# Patient Record
Sex: MALE | Race: WHITE | NOT HISPANIC OR LATINO | Employment: OTHER | URBAN - METROPOLITAN AREA
[De-identification: names, ages, dates, MRNs, and addresses within clinical notes are randomized per-mention and may not be internally consistent; named-entity substitution may affect disease eponyms.]

---

## 2017-06-18 ENCOUNTER — HOSPITAL ENCOUNTER (EMERGENCY)
Facility: MEDICAL CENTER | Age: 82
End: 2017-06-18
Attending: EMERGENCY MEDICINE
Payer: MEDICARE

## 2017-06-18 VITALS
RESPIRATION RATE: 16 BRPM | DIASTOLIC BLOOD PRESSURE: 57 MMHG | TEMPERATURE: 98.1 F | HEART RATE: 85 BPM | OXYGEN SATURATION: 97 % | BODY MASS INDEX: 24.91 KG/M2 | HEIGHT: 67 IN | WEIGHT: 158.73 LBS | SYSTOLIC BLOOD PRESSURE: 104 MMHG

## 2017-06-18 DIAGNOSIS — N30.00 ACUTE CYSTITIS WITHOUT HEMATURIA: ICD-10-CM

## 2017-06-18 LAB
ANION GAP SERPL CALC-SCNC: 8 MMOL/L (ref 0–11.9)
APPEARANCE UR: CLEAR
BACTERIA #/AREA URNS HPF: ABNORMAL /HPF
BASOPHILS # BLD AUTO: 0.3 % (ref 0–1.8)
BASOPHILS # BLD: 0.03 K/UL (ref 0–0.12)
BILIRUB UR QL STRIP.AUTO: NEGATIVE
BUN SERPL-MCNC: 24 MG/DL (ref 8–22)
CALCIUM SERPL-MCNC: 8.8 MG/DL (ref 8.5–10.5)
CHLORIDE SERPL-SCNC: 105 MMOL/L (ref 96–112)
CO2 SERPL-SCNC: 21 MMOL/L (ref 20–33)
COLOR UR: YELLOW
CREAT SERPL-MCNC: 1 MG/DL (ref 0.5–1.4)
CULTURE IF INDICATED INDCX: YES UA CULTURE
EOSINOPHIL # BLD AUTO: 0.12 K/UL (ref 0–0.51)
EOSINOPHIL NFR BLD: 1.4 % (ref 0–6.9)
EPI CELLS #/AREA URNS HPF: ABNORMAL /HPF
ERYTHROCYTE [DISTWIDTH] IN BLOOD BY AUTOMATED COUNT: 51.2 FL (ref 35.9–50)
GFR SERPL CREATININE-BSD FRML MDRD: >60 ML/MIN/1.73 M 2
GLUCOSE SERPL-MCNC: 102 MG/DL (ref 65–99)
GLUCOSE UR STRIP.AUTO-MCNC: NEGATIVE MG/DL
HCT VFR BLD AUTO: 36.8 % (ref 42–52)
HGB BLD-MCNC: 12.8 G/DL (ref 14–18)
IMM GRANULOCYTES # BLD AUTO: 0.05 K/UL (ref 0–0.11)
IMM GRANULOCYTES NFR BLD AUTO: 0.6 % (ref 0–0.9)
KETONES UR STRIP.AUTO-MCNC: NEGATIVE MG/DL
LEUKOCYTE ESTERASE UR QL STRIP.AUTO: ABNORMAL
LYMPHOCYTES # BLD AUTO: 1.39 K/UL (ref 1–4.8)
LYMPHOCYTES NFR BLD: 15.7 % (ref 22–41)
MCH RBC QN AUTO: 35.9 PG (ref 27–33)
MCHC RBC AUTO-ENTMCNC: 34.8 G/DL (ref 33.7–35.3)
MCV RBC AUTO: 103.1 FL (ref 81.4–97.8)
MICRO URNS: ABNORMAL
MONOCYTES # BLD AUTO: 1.03 K/UL (ref 0–0.85)
MONOCYTES NFR BLD AUTO: 11.6 % (ref 0–13.4)
NEUTROPHILS # BLD AUTO: 6.26 K/UL (ref 1.82–7.42)
NEUTROPHILS NFR BLD: 70.4 % (ref 44–72)
NITRITE UR QL STRIP.AUTO: NEGATIVE
NRBC # BLD AUTO: 0 K/UL
NRBC BLD AUTO-RTO: 0 /100 WBC
PH UR STRIP.AUTO: 5 [PH]
PLATELET # BLD AUTO: 143 K/UL (ref 164–446)
PMV BLD AUTO: 11.4 FL (ref 9–12.9)
POTASSIUM SERPL-SCNC: 3.6 MMOL/L (ref 3.6–5.5)
PROT UR QL STRIP: NEGATIVE MG/DL
RBC # BLD AUTO: 3.57 M/UL (ref 4.7–6.1)
RBC # URNS HPF: ABNORMAL /HPF
RBC UR QL AUTO: NEGATIVE
SODIUM SERPL-SCNC: 134 MMOL/L (ref 135–145)
SP GR UR STRIP.AUTO: 1.01
WBC # BLD AUTO: 8.9 K/UL (ref 4.8–10.8)
WBC #/AREA URNS HPF: ABNORMAL /HPF

## 2017-06-18 PROCEDURE — 51798 US URINE CAPACITY MEASURE: CPT

## 2017-06-18 PROCEDURE — 85025 COMPLETE CBC W/AUTO DIFF WBC: CPT

## 2017-06-18 PROCEDURE — A9270 NON-COVERED ITEM OR SERVICE: HCPCS | Performed by: EMERGENCY MEDICINE

## 2017-06-18 PROCEDURE — 80048 BASIC METABOLIC PNL TOTAL CA: CPT

## 2017-06-18 PROCEDURE — 87086 URINE CULTURE/COLONY COUNT: CPT

## 2017-06-18 PROCEDURE — 36415 COLL VENOUS BLD VENIPUNCTURE: CPT

## 2017-06-18 PROCEDURE — 87077 CULTURE AEROBIC IDENTIFY: CPT

## 2017-06-18 PROCEDURE — 700102 HCHG RX REV CODE 250 W/ 637 OVERRIDE(OP): Performed by: EMERGENCY MEDICINE

## 2017-06-18 PROCEDURE — 81001 URINALYSIS AUTO W/SCOPE: CPT

## 2017-06-18 PROCEDURE — 99284 EMERGENCY DEPT VISIT MOD MDM: CPT

## 2017-06-18 PROCEDURE — 87186 SC STD MICRODIL/AGAR DIL: CPT

## 2017-06-18 RX ORDER — VALACYCLOVIR HYDROCHLORIDE 500 MG/1
500 TABLET, FILM COATED ORAL 2 TIMES DAILY
COMMUNITY

## 2017-06-18 RX ORDER — TAMSULOSIN HYDROCHLORIDE 0.4 MG/1
0.4 CAPSULE ORAL
COMMUNITY

## 2017-06-18 RX ORDER — SULFAMETHOXAZOLE AND TRIMETHOPRIM 800; 160 MG/1; MG/1
1 TABLET ORAL ONCE
Status: COMPLETED | OUTPATIENT
Start: 2017-06-18 | End: 2017-06-18

## 2017-06-18 RX ORDER — SULFAMETHOXAZOLE AND TRIMETHOPRIM 800; 160 MG/1; MG/1
1 TABLET ORAL 2 TIMES DAILY
Qty: 14 TAB | Refills: 0 | Status: SHIPPED | OUTPATIENT
Start: 2017-06-18

## 2017-06-18 RX ORDER — MYCOPHENOLATE MOFETIL 500 MG/1
1000 TABLET ORAL 2 TIMES DAILY
COMMUNITY

## 2017-06-18 RX ORDER — PREDNISONE 10 MG/1
10 TABLET ORAL DAILY
COMMUNITY

## 2017-06-18 RX ORDER — ALBUTEROL SULFATE 90 UG/1
2 AEROSOL, METERED RESPIRATORY (INHALATION) EVERY 6 HOURS PRN
COMMUNITY

## 2017-06-18 RX ORDER — DIAPER,BRIEF,ADULT, DISPOSABLE
1 EACH MISCELLANEOUS DAILY
COMMUNITY

## 2017-06-18 RX ORDER — MONTELUKAST SODIUM 10 MG/1
10 TABLET ORAL DAILY
COMMUNITY

## 2017-06-18 RX ORDER — LEVOTHYROXINE SODIUM 0.05 MG/1
50 TABLET ORAL
COMMUNITY

## 2017-06-18 RX ORDER — ESOMEPRAZOLE MAGNESIUM 40 MG/1
40 CAPSULE, DELAYED RELEASE ORAL DAILY
COMMUNITY

## 2017-06-18 RX ORDER — METRONIDAZOLE 10 MG/G
1 GEL TOPICAL 2 TIMES DAILY
COMMUNITY

## 2017-06-18 RX ADMIN — SULFAMETHOXAZOLE AND TRIMETHOPRIM 1 TABLET: 800; 160 TABLET ORAL at 17:11

## 2017-06-18 ASSESSMENT — PAIN SCALES - GENERAL: PAINLEVEL_OUTOF10: 1

## 2017-06-18 ASSESSMENT — LIFESTYLE VARIABLES: DO YOU DRINK ALCOHOL: NO

## 2017-06-18 NOTE — ED NOTES
"Pt reports urinary retention, reports decrease \"flow\". Pt denies urinary pain or blood in urine. Pt reports urinary hx of incontinence.   "

## 2017-06-18 NOTE — ED AVS SNAPSHOT
BenchPrep Access Code: [email or web address]  Expires: 7/18/2017  5:04 PM    Your email address is not on file at [facility].  Email Addresses are required for you to sign up for BenchPrep, please contact [phone] to verify your personal information and to provide your email address prior to attempting to register for Bench[place] [address]    [employer]  A secure, online tool to manage your health information     [facility]’s BenchPrep® is a secure, online tool that connects you to your personalized health information from the privacy of your home -- day or night - making it very easy for you to manage your healthcare. Once the activation process is completed, you can even access your medical information using the BenchPrep merritt, which is available for free in the Apple Merritt store or Google Play store.     To learn more about BenchPrep, visit [email or web address]    There are two levels of access available (as shown below):   My Chart Features  [facility] Primary Care Doctor [facility]  Specialists [facility]  Urgent  Care Non-[facility] Primary Care Doctor   Email your healthcare team securely and privately 24/7 X X X    Manage appointments: schedule your next appointment; view details of past/upcoming appointments X      Request prescription refills. X      View recent personal medical records, including lab and immunizations X X X X   View health record, including health history, allergies, medications X X X X   Read reports about your outpatient visits, procedures, consult and ER notes X X X X   See your discharge summary, which is a recap of your hospital and/or ER visit that includes your diagnosis, lab results, and care plan X X  X     How to register for [employer]:  Once your e-mail address has been verified, follow the following steps to sign up for BenchPrep.     1. Go to  [email or web address]  2. Click on the Sign Up Now box, which takes you to the New Member Sign Up page. You  will need to provide the following information:  a. Enter your [employer] Access Code exactly as it appears at the top of this page. (You will not need to use this code after you’ve completed the sign-up process. If you do not sign up before the expiration date, you must request a new code.)   b. Enter your date of birth.   c. Enter your home email address.   d. Click Submit, and follow the next screen’s instructions.  3. Create a [email or web address] ID. This will be your [employer] login ID and cannot be changed, so think of one that is secure and easy to remember.  4. Create a [employer] password. You can change your password at any time.  5. Enter your Password Reset Question and Answer. This can be used at a later time if you forget your password.   6. Enter your e-mail address. This allows you to receive e-mail notifications when new information is available in [employer].  7. Click Sign Up. You can now view your health information.    For assistance activating your [employer] account, call [phone]

## 2017-06-18 NOTE — ED NOTES
Med Rec completed per patient  Allergies reviewed  No ORAL antibiotics in last 30 days    Patient takes prednisone for Myasthenia Gravis

## 2017-06-18 NOTE — ED NOTES
Blood and urine sample sent to lab.  Called [employer] tech for PVR bladder scan  Called [employer] for assistance pt's medication list

## 2017-06-18 NOTE — ED NOTES
".  Chief Complaint   Patient presents with   • Urinary Pain   Pt reports 1/10 abdominal pain, states \" very very slight discomfort.\"  Increased frequency when urinating. + minor dysuria. Onset last evening. No flank pain. No fever/chills.     "

## 2017-06-18 NOTE — ED AVS SNAPSHOT
Home Care Instructions                                                                                                                Mike Lynn   MRN: [id number]    Department:  [facility], Emergency Dept   Date of Visit:  6/18/2017            [facility], Emergency Dept    [address]    [place] [address]    Phone:  [phone]      You were seen by     Rommel Marshall D.O.      Your Diagnosis Was     Acute cystitis without hematuria     N30.00       Follow-up Information     1. Please follow up.    Why:  Follow up with your physician      Medication Information     Review all of your home medications and newly ordered medications with your primary doctor and/or pharmacist as soon as possible. Follow medication instructions as directed by your doctor and/or pharmacist.     Please keep your complete medication list with you and share with your physician. Update the information when medications are discontinued, doses are changed, or new medications (including over-the-counter products) are added; and carry medication information at all times in the event of emergency situations.               Medication List      START taking these medications        Instructions    Morning Afternoon Evening Bedtime    * sulfamethoxazole-trimethoprim 800-160 MG tablet   Commonly known as:  BACTRIM DS        Take 1 Tab by mouth 2 times a day.   Dose:  1 Tab                        * sulfamethoxazole-trimethoprim 800-160 MG tablet   Commonly known as:  BACTRIM DS        Take 1 Tab by mouth 2 times a day.   Dose:  1 Tab                        * Notice:  This list has 2 medication(s) that are the same as other medications prescribed for you. Read the directions carefully, and ask your doctor or other care provider to review them with you.      ASK your doctor about these medications        Instructions    Morning Afternoon Evening Bedtime    albuterol 108 (90 BASE) MCG/ACT Aers  inhalation aerosol        Inhale 2 Puffs by mouth every 6 hours as needed for Shortness of Breath.   Dose:  2 Puff                        ASTRAGALUS PO        Take 2,000 mg by mouth every day.   Dose:  2000 mg                        CELLCEPT 500 MG tablet   Generic drug:  mycophenolate        Take 1,000 mg by mouth 2 times a day.   Dose:  1000 mg                        CITRACAL MAXIMUM 315-250 MG-UNIT Tabs   Generic drug:  Calcium Citrate-Vitamin D        Take 1 Tab by mouth every day.   Dose:  1 Tab                        ELIQUIS 5mg Tabs   Generic drug:  apixaban        Take 5 mg by mouth 2 Times a Day.   Dose:  5 mg                        MARGARITO C PO        Take 1,000 mg by mouth every day.   Dose:  1000 mg                        Lecithin 1200 MG Caps        Take 1 Cap by mouth every day.   Dose:  1 Cap                        levothyroxine 50 MCG Tabs   Commonly known as:  SYNTHROID        Take 50 mcg by mouth Every morning on an empty stomach.   Dose:  50 mcg                        LIMBREL 500 MG Caps   Generic drug:  Flavocoxid        Take 1 Cap by mouth every day.   Dose:  1 Cap                        METROGEL 1 % gel   Generic drug:  metronidazole        Apply 1 Application to affected area(s) 2 times a day. CHEST & SHOULDERS   Dose:  1 Application                        montelukast 10 MG Tabs   Commonly known as:  SINGULAIR        Take 10 mg by mouth every day.   Dose:  10 mg                        multivitamin Tabs        Take 1 Tab by mouth every day.   Dose:  1 Tab                        mupirocin 2 % Oint   Commonly known as:  BACTROBAN        Apply 1 Application to affected area(s) 2 times a day. BUTTOCKS   Dose:  1 Application                        NEXIUM 40 MG delayed-release capsule   Generic drug:  esomeprazole        Take 40 mg by mouth every day.   Dose:  40 mg                        predniSONE 10 MG Tabs   Commonly known as:  DELTASONE        Take 10 mg by mouth every day.   Dose:  10 mg                         PROBIOTIC DAILY PO        Take 2 Tabs by mouth every day.   Dose:  2 Tab                        tamsulosin 0.4 MG capsule   Commonly known as:  FLOMAX        Take 0.4 mg by mouth ONE-HALF HOUR AFTER BREAKFAST.   Dose:  0.4 mg                        valacyclovir 500 MG Tabs   Commonly known as:  VALTREX        Take 500 mg by mouth 2 times a day. TYPE 1 HERPES   Dose:  500 mg                        VERAMYST 27.5 MCG/SPRAY nasal spray   Generic drug:  fluticasone        Spray 2 Sprays in nose 3 times a day as needed.   Dose:  2 Spray                        vitamin D 1000 UNIT Tabs   Commonly known as:  cholecalciferol        Take 1,000 Units by mouth every day.   Dose:  1000 Units                             Where to Get Your Medications      You can get these medications from any pharmacy     Bring a paper prescription for each of these medications    - sulfamethoxazole-trimethoprim 800-160 MG tablet  - sulfamethoxazole-trimethoprim 800-160 MG tablet            Procedures and tests performed during your visit     BASIC METABOLIC PANEL    CBC WITH DIFFERENTIAL    ESTIMATED GFR    ORTHOPEDIC TECHNICIAN ASSISTANCE    URINALYSIS,CULTURE IF INDICATED    URINE CULTURE(NEW)    URINE MICROSCOPIC (W/UA)        Discharge Instructions       Urinary Tract Infection  A urinary tract infection (UTI) can occur any place along the urinary tract. The tract includes the kidneys, ureters, bladder, and urethra. A type of germ called bacteria often causes a UTI. UTIs are often helped with antibiotic medicine.   HOME CARE   · If given, take antibiotics as told by your doctor. Finish them even if you start to feel better.  · Drink enough fluids to keep your pee (urine) clear or pale yellow.  · Avoid tea, drinks with caffeine, and bubbly (carbonated) drinks.  · Pee often. Avoid holding your pee in for a long time.  · Pee before and after having sex (intercourse).  · Wipe from front to back after you poop (bowel movement) if you  are a woman. Use each tissue only once.  GET HELP RIGHT AWAY IF:   · You have back pain.  · You have lower belly (abdominal) pain.  · You have chills.  · You feel sick to your stomach (nauseous).  · You throw up (vomit).  · Your burning or discomfort with peeing does not go away.  · You have a fever.  · Your symptoms are not better in 3 days.  MAKE SURE YOU:   · Understand these instructions.  · Will watch your condition.  · Will get help right away if you are not doing well or get worse.     This information is not intended to replace advice given to you by your health care provider. Make sure you discuss any questions you have with your health care provider.     Document Released: 06/05/2009 Document Revised: 01/08/2016 Document Reviewed: 07/18/2013  [employer] Interactive Patient Education ©2016 [employer]            Patient Information     Patient Information    Following emergency treatment: all patient requiring follow-up care must return either to a private physician or a clinic if your condition worsens before you are able to obtain further medical attention, please return to the emergency room.     Billing Information    At [facility], we work to make the billing process streamlined for our patients.  Our Representatives are here to answer any questions you may have regarding your hospital bill.  If you have insurance coverage and have supplied your insurance information to us, we will submit a claim to your insurer on your behalf.  Should you have any questions regarding your bill, we can be reached online or by phone as follows:  Online: You are able pay your bills online or live chat with our representatives about any billing questions you may have. We are here to help Monday - Friday from 8:00am to 7:30pm and 9:00am - 12:00pm on Saturdays.  Please visit [email or web address]  for more information.   Phone:  [phone] or [phone]    Please note that your  emergency physician, surgeon, pathologist, radiologist, anesthesiologist, and other specialists are not employed by [facility] and will therefore bill separately for their services.  Please contact them directly for any questions concerning their bills at the numbers below:     Emergency Physician Services:  [phone]  [facility]:  [phone]  [facility]:  [phone]  [facility]:  [phone]    1. Your final bill may vary from the amount quoted upon discharge if all procedures are not complete at that time, or if your doctor has additional procedures of which we are not aware. You will receive an additional bill if you return to the Emergency Department at [facility] for suture removal regardless of the facility of which the sutures were placed.     2. Please arrange for settlement of this account at the emergency registration.    3. All self-pay accounts are due in full at the time of treatment.  If you are unable to meet this obligation then payment is expected within 4-5 days.     4. If you have had radiology studies (CT, X-ray, Ultrasound, MRI), you have received a preliminary result during your emergency department visit. Please contact the radiology department [phone] to receive a copy of your final result. Please discuss the Final result with your primary physician or with the follow up physician provided.     Crisis Hotline:  [place] Crisis Hotline:  9-108-OREXRXP or 1-155.853.4630  [place] Crisis Hotline:    1-815.606.1126 or 616-154-1202         ED Discharge Follow Up Questions    1. In order to provide you with very good care, we would like to follow up with a phone call in the next few days.  May we have your permission to contact you?     YES /  NO    2. What is the best phone number to call you? (       )_____-__________    3. What is the best time to call you?      Morning  /  Afternoon  /  Evening                   Patient Signature:   ____________________________________________________________    Date:  ____________________________________________________________

## 2017-06-18 NOTE — DISCHARGE INSTRUCTIONS
Urinary Tract Infection  A urinary tract infection (UTI) can occur any place along the urinary tract. The tract includes the kidneys, ureters, bladder, and urethra. A type of germ called bacteria often causes a UTI. UTIs are often helped with antibiotic medicine.   HOME CARE   · If given, take antibiotics as told by your doctor. Finish them even if you start to feel better.  · Drink enough fluids to keep your pee (urine) clear or pale yellow.  · Avoid tea, drinks with caffeine, and bubbly (carbonated) drinks.  · Pee often. Avoid holding your pee in for a long time.  · Pee before and after having sex (intercourse).  · Wipe from front to back after you poop (bowel movement) if you are a woman. Use each tissue only once.  GET HELP RIGHT AWAY IF:   · You have back pain.  · You have lower belly (abdominal) pain.  · You have chills.  · You feel sick to your stomach (nauseous).  · You throw up (vomit).  · Your burning or discomfort with peeing does not go away.  · You have a fever.  · Your symptoms are not better in 3 days.  MAKE SURE YOU:   · Understand these instructions.  · Will watch your condition.  · Will get help right away if you are not doing well or get worse.     This information is not intended to replace advice given to you by your health care provider. Make sure you discuss any questions you have with your health care provider.     Document Released: 06/05/2009 Document Revised: 01/08/2016 Document Reviewed: 07/18/2013  [employer] Interactive Patient Education ©2016 [employer]

## 2017-06-19 NOTE — ED NOTES
Discharge instructions reviewed, questions answered.  Pt to lobby with family, escorted by RN. Pt provided with prescription X 1.  Pt states all belongings in possession.

## 2017-06-20 ENCOUNTER — HOSPITAL ENCOUNTER (EMERGENCY)
Facility: MEDICAL CENTER | Age: 82
End: 2017-06-20
Attending: EMERGENCY MEDICINE
Payer: MEDICARE

## 2017-06-20 VITALS
RESPIRATION RATE: 15 BRPM | SYSTOLIC BLOOD PRESSURE: 101 MMHG | OXYGEN SATURATION: 97 % | WEIGHT: 156.75 LBS | BODY MASS INDEX: 23.76 KG/M2 | HEIGHT: 68 IN | HEART RATE: 75 BPM | DIASTOLIC BLOOD PRESSURE: 61 MMHG | TEMPERATURE: 99.3 F

## 2017-06-20 DIAGNOSIS — N39.0 ACUTE UTI: ICD-10-CM

## 2017-06-20 LAB
APPEARANCE UR: ABNORMAL
BACTERIA #/AREA URNS HPF: ABNORMAL /HPF
BACTERIA UR CULT: ABNORMAL
BACTERIA UR CULT: ABNORMAL
BILIRUB UR QL STRIP.AUTO: NEGATIVE
COLOR UR: YELLOW
CULTURE IF INDICATED INDCX: YES UA CULTURE
EPI CELLS #/AREA URNS HPF: ABNORMAL /HPF
GLUCOSE UR STRIP.AUTO-MCNC: NEGATIVE MG/DL
HYALINE CASTS #/AREA URNS LPF: ABNORMAL /LPF
KETONES UR STRIP.AUTO-MCNC: NEGATIVE MG/DL
LEUKOCYTE ESTERASE UR QL STRIP.AUTO: ABNORMAL
MICRO URNS: ABNORMAL
NITRITE UR QL STRIP.AUTO: NEGATIVE
PH UR STRIP.AUTO: 5.5 [PH]
PROT UR QL STRIP: NEGATIVE MG/DL
RBC # URNS HPF: ABNORMAL /HPF
RBC UR QL AUTO: NEGATIVE
SIGNIFICANT IND 70042: ABNORMAL
SITE SITE: ABNORMAL
SOURCE SOURCE: ABNORMAL
SP GR UR STRIP.AUTO: 1.01
WBC #/AREA URNS HPF: ABNORMAL /HPF

## 2017-06-20 PROCEDURE — 87086 URINE CULTURE/COLONY COUNT: CPT

## 2017-06-20 PROCEDURE — 700102 HCHG RX REV CODE 250 W/ 637 OVERRIDE(OP): Performed by: EMERGENCY MEDICINE

## 2017-06-20 PROCEDURE — 87186 SC STD MICRODIL/AGAR DIL: CPT

## 2017-06-20 PROCEDURE — 87077 CULTURE AEROBIC IDENTIFY: CPT

## 2017-06-20 PROCEDURE — A9270 NON-COVERED ITEM OR SERVICE: HCPCS | Performed by: EMERGENCY MEDICINE

## 2017-06-20 PROCEDURE — 99284 EMERGENCY DEPT VISIT MOD MDM: CPT

## 2017-06-20 PROCEDURE — 81001 URINALYSIS AUTO W/SCOPE: CPT

## 2017-06-20 RX ORDER — NITROFURANTOIN 25; 75 MG/1; MG/1
100 CAPSULE ORAL ONCE
Status: COMPLETED | OUTPATIENT
Start: 2017-06-20 | End: 2017-06-20

## 2017-06-20 RX ORDER — NITROFURANTOIN 25; 75 MG/1; MG/1
100 CAPSULE ORAL 2 TIMES DAILY
Qty: 20 CAP | Refills: 0 | Status: SHIPPED | OUTPATIENT
Start: 2017-06-20

## 2017-06-20 RX ADMIN — NITROFURANTOIN (MONOHYDRATE/MACROCRYSTALS) 100 MG: 75; 25 CAPSULE ORAL at 14:38

## 2017-06-20 ASSESSMENT — LIFESTYLE VARIABLES: DO YOU DRINK ALCOHOL: NO

## 2017-06-20 ASSESSMENT — PAIN SCALES - GENERAL: PAINLEVEL_OUTOF10: 2

## 2017-06-20 NOTE — ED AVS SNAPSHOT
6/20/2017    Mike Lynn  [address] [place] [address]    Dear Mike:    [facility] wants to ensure your discharge home is safe and you or your loved ones have had all of your questions answered regarding your care after you leave the hospital.    Below is a list of resources and contact information should you have any questions regarding your hospital stay, follow-up instructions, or active medical symptoms.    Questions or Concerns Regarding… Contact   Medical Questions Related to Your Discharge  (7 days a week, 8am-5pm) Contact a Nurse Care Coordinator   [phone]   Medical Questions Not Related to Your Discharge  (24 hours a day / 7 days a week)  Contact the Nurse Health Line   [phone]    Medications or Discharge Instructions Refer to your discharge packet   or contact your [facility] Primary Care Provider   [phone]   Follow-up Appointment(s) Schedule your appointment via [employer]   or contact Scheduling [phone]   Billing Review your statement via InVision  or contact Billing [phone]   Medical Records Review your records via [employer]   or contact Medical Records [phone]     You may receive a telephone call within two days of discharge. This call is to make certain you understand your discharge instructions and have the opportunity to have any questions answered. You can also easily access your medical information, test results and upcoming appointments via the In[employer]ision free online health management tool. You can learn more and sign up at [employer]Supply/InVision. For assistance setting up your InVision account, please call [phone].    Once again, we want to ensure your discharge home is safe and that you have a clear understanding of any next steps in your care. If you have any questions or concerns, please do not hesitate to contact us, we are here for you. Thank you for choosing [facility] for your healthcare needs.    Sincerely,    Your [facility] Healthcare Team

## 2017-06-20 NOTE — ED NOTES
All lines and monitors d/c'd. Discharge paperwork and medications reviewed with pt and his wife. Pt states he understands and has no questions. Pt encouraged to follow-up with neurology and come back to ER with worsening symptoms. Instructed not to drive after taking pain medication. Pt verbalizes understanding. Pt ambulated out of ED of with steady gait.

## 2017-06-20 NOTE — ED AVS SNAPSHOT
Home Care Instructions                                                                                                                Mike Lynn   MRN: [id number]    Department:  [facility], Emergency Dept   Date of Visit:  6/20/2017            [facility], Emergency Dept    [address]    [place] [address]    Phone:  [phone]      You were seen by     Smith Bueno D.O.      Your Diagnosis Was     Acute UTI     N39.0       Follow-up Information     1. Follow up with Wenceslao Dugan M.D..    Specialty:  Urology    Contact information    [address]  [place] [address] [phone]        Medication Information     Review all of your home medications and newly ordered medications with your primary doctor and/or pharmacist as soon as possible. Follow medication instructions as directed by your doctor and/or pharmacist.     Please keep your complete medication list with you and share with your physician. Update the information when medications are discontinued, doses are changed, or new medications (including over-the-counter products) are added; and carry medication information at all times in the event of emergency situations.               Medication List      START taking these medications        Instructions    Morning Afternoon Evening Bedtime    nitrofurantoin monohydr macro 100 MG Caps   Commonly known as:  MACROBID        Take 1 Cap by mouth 2 times a day.   Dose:  100 mg                          ASK your doctor about these medications        Instructions    Morning Afternoon Evening Bedtime    albuterol 108 (90 BASE) MCG/ACT Aers inhalation aerosol        Inhale 2 Puffs by mouth every 6 hours as needed for Shortness of Breath.   Dose:  2 Puff                        ASTRAGALUS PO        Take 2,000 mg by mouth every day.   Dose:  2000 mg                        CELLCEPT 500 MG tablet   Generic drug:  mycophenolate        Take 1,000 mg by mouth  2 times a day.   Dose:  1000 mg                        CITRACAL MAXIMUM 315-250 MG-UNIT Tabs   Generic drug:  Calcium Citrate-Vitamin D        Take 1 Tab by mouth every day.   Dose:  1 Tab                        ELIQUIS 5mg Tabs   Generic drug:  apixaban        Take 5 mg by mouth 2 Times a Day.   Dose:  5 mg                        MARGARITO C PO        Take 1,000 mg by mouth every day.   Dose:  1000 mg                        Lecithin 1200 MG Caps        Take 1 Cap by mouth every day.   Dose:  1 Cap                        levothyroxine 50 MCG Tabs   Commonly known as:  SYNTHROID        Take 50 mcg by mouth Every morning on an empty stomach.   Dose:  50 mcg                        LIMBREL 500 MG Caps   Generic drug:  Flavocoxid        Take 1 Cap by mouth every day.   Dose:  1 Cap                        METROGEL 1 % gel   Generic drug:  metronidazole        Apply 1 Application to affected area(s) 2 times a day. CHEST & SHOULDERS   Dose:  1 Application                        montelukast 10 MG Tabs   Commonly known as:  SINGULAIR        Take 10 mg by mouth every day.   Dose:  10 mg                        multivitamin Tabs        Take 1 Tab by mouth every day.   Dose:  1 Tab                        mupirocin 2 % Oint   Commonly known as:  BACTROBAN        Apply 1 Application to affected area(s) 2 times a day. BUTTOCKS   Dose:  1 Application                        NEXIUM 40 MG delayed-release capsule   Generic drug:  esomeprazole        Take 40 mg by mouth every day.   Dose:  40 mg                        predniSONE 10 MG Tabs   Commonly known as:  DELTASONE        Take 10 mg by mouth every day.   Dose:  10 mg                        PROBIOTIC DAILY PO        Take 2 Tabs by mouth every day.   Dose:  2 Tab                        * sulfamethoxazole-trimethoprim 800-160 MG tablet   Commonly known as:  BACTRIM DS        Take 1 Tab by mouth 2 times a day.   Dose:  1 Tab                        * sulfamethoxazole-trimethoprim 800-160 MG  tablet   Commonly known as:  BACTRIM DS        Take 1 Tab by mouth 2 times a day.   Dose:  1 Tab                        tamsulosin 0.4 MG capsule   Commonly known as:  FLOMAX        Take 0.4 mg by mouth ONE-HALF HOUR AFTER BREAKFAST.   Dose:  0.4 mg                        valacyclovir 500 MG Tabs   Commonly known as:  VALTREX        Take 500 mg by mouth 2 times a day. TYPE 1 HERPES   Dose:  500 mg                        VERAMYST 27.5 MCG/SPRAY nasal spray   Generic drug:  fluticasone        Spray 2 Sprays in nose 3 times a day as needed.   Dose:  2 Spray                        vitamin D 1000 UNIT Tabs   Commonly known as:  cholecalciferol        Take 1,000 Units by mouth every day.   Dose:  1000 Units                        * Notice:  This list has 2 medication(s) that are the same as other medications prescribed for you. Read the directions carefully, and ask your doctor or other care provider to review them with you.         Where to Get Your Medications      You can get these medications from any pharmacy     Bring a paper prescription for each of these medications    - nitrofurantoin monohydr macro 100 MG Caps            Procedures and tests performed during your visit     URINALYSIS,CULTURE IF INDICATED    URINE MICROSCOPIC (W/UA)        Discharge Instructions       Urinary Tract Infection  A urinary tract infection (UTI) can occur any place along the urinary tract. The tract includes the kidneys, ureters, bladder, and urethra. A type of germ called bacteria often causes a UTI. UTIs are often helped with antibiotic medicine.   HOME CARE   · If given, take antibiotics as told by your doctor. Finish them even if you start to feel better.  · Drink enough fluids to keep your pee (urine) clear or pale yellow.  · Avoid tea, drinks with caffeine, and bubbly (carbonated) drinks.  · Pee often. Avoid holding your pee in for a long time.  · Pee before and after having sex (intercourse).  · Wipe from front to back after  you poop (bowel movement) if you are a woman. Use each tissue only once.  GET HELP RIGHT AWAY IF:   · You have back pain.  · You have lower belly (abdominal) pain.  · You have chills.  · You feel sick to your stomach (nauseous).  · You throw up (vomit).  · Your burning or discomfort with peeing does not go away.  · You have a fever.  · Your symptoms are not better in 3 days.  MAKE SURE YOU:   · Understand these instructions.  · Will watch your condition.  · Will get help right away if you are not doing well or get worse.     This information is not intended to replace advice given to you by your health care provider. Make sure you discuss any questions you have with your health care provider.     Document Released: 06/05/2009 Document Revised: 01/08/2016 Document Reviewed: 07/18/2013  [employer] Interactive Patient Education ©2016 [employer]            Patient Information     Patient Information    Following emergency treatment: all patient requiring follow-up care must return either to a private physician or a clinic if your condition worsens before you are able to obtain further medical attention, please return to the emergency room.     Billing Information    At [facility], we work to make the billing process streamlined for our patients.  Our Representatives are here to answer any questions you may have regarding your hospital bill.  If you have insurance coverage and have supplied your insurance information to us, we will submit a claim to your insurer on your behalf.  Should you have any questions regarding your bill, we can be reached online or by phone as follows:  Online: You are able pay your bills online or live chat with our representatives about any billing questions you may have. We are here to help Monday - Friday from 8:00am to 7:30pm and 9:00am - 12:00pm on Saturdays.  Please visit [email or web address]  for more information.   Phone:  [phone] or  [phone]    Please note that your emergency physician, surgeon, pathologist, radiologist, anesthesiologist, and other specialists are not employed by [facility] and will therefore bill separately for their services.  Please contact them directly for any questions concerning their bills at the numbers below:     Emergency Physician Services:  [phone]  [facility]:  [phone]  [facility]:  [phone]  [facility]:  [phone]    1. Your final bill may vary from the amount quoted upon discharge if all procedures are not complete at that time, or if your doctor has additional procedures of which we are not aware. You will receive an additional bill if you return to the Emergency Department at [facility] for suture removal regardless of the facility of which the sutures were placed.     2. Please arrange for settlement of this account at the emergency registration.    3. All self-pay accounts are due in full at the time of treatment.  If you are unable to meet this obligation then payment is expected within 4-5 days.     4. If you have had radiology studies (CT, X-ray, Ultrasound, MRI), you have received a preliminary result during your emergency department visit. Please contact the radiology department [phone] to receive a copy of your final result. Please discuss the Final result with your primary physician or with the follow up physician provided.     Crisis Hotline:  [place] Crisis Hotline:  9-113-GXDTYMH or 1-662.240.7487  [place] Crisis Hotline:    1-158.846.2339 or 356-672-3699         ED Discharge Follow Up Questions    1. In order to provide you with very good care, we would like to follow up with a phone call in the next few days.  May we have your permission to contact you?     YES /  NO    2. What is the best phone number to call you? (       )_____-__________    3. What is the best time to call you?      Morning  /  Afternoon  /   Evening                   Patient Signature:  ____________________________________________________________    Date:  ____________________________________________________________

## 2017-06-20 NOTE — DISCHARGE INSTRUCTIONS
Urinary Tract Infection  A urinary tract infection (UTI) can occur any place along the urinary tract. The tract includes the kidneys, ureters, bladder, and urethra. A type of germ called bacteria often causes a UTI. UTIs are often helped with antibiotic medicine.   HOME CARE   · If given, take antibiotics as told by your doctor. Finish them even if you start to feel better.  · Drink enough fluids to keep your pee (urine) clear or pale yellow.  · Avoid tea, drinks with caffeine, and bubbly (carbonated) drinks.  · Pee often. Avoid holding your pee in for a long time.  · Pee before and after having sex (intercourse).  · Wipe from front to back after you poop (bowel movement) if you are a woman. Use each tissue only once.  GET HELP RIGHT AWAY IF:   · You have back pain.  · You have lower belly (abdominal) pain.  · You have chills.  · You feel sick to your stomach (nauseous).  · You throw up (vomit).  · Your burning or discomfort with peeing does not go away.  · You have a fever.  · Your symptoms are not better in 3 days.  MAKE SURE YOU:   · Understand these instructions.  · Will watch your condition.  · Will get help right away if you are not doing well or get worse.     This information is not intended to replace advice given to you by your health care provider. Make sure you discuss any questions you have with your health care provider.     Document Released: 06/05/2009 Document Revised: 01/08/2016 Document Reviewed: 07/18/2013  [employer] Interactive Patient Education ©2016 [employer]

## 2017-06-20 NOTE — ED PROVIDER NOTES
ED Provider Note    CHIEF COMPLAINT  Chief Complaint   Patient presents with   • UTI     seen here for same       HPI  Mike Lynn is a 85 y.o. male here for evaluation of dysuria. Patient states that he was seen and evaluated couple of days ago, was placed on Bactrim. At this time, he states he is still having symptoms of burning and urgency/frequency. He states he is taking antibiotics as prescribed. He has no fever, no vomiting, no abdominal pain, and no back pain. He denies any headache or chest pain, and has no shortness of breath. He states that he has to be careful about his antibiotics secondary to his history of myasthenia gravis.    PAST MEDICAL HISTORY   has a past medical history of Myasthenia gravis (CMS-[facility]).    SOCIAL HISTORY  Social History     Social History Main Topics   • Smoking status: Never Smoker    • Smokeless tobacco: Not on file   • Alcohol Use: No   • Drug Use: No   • Sexual Activity: Not on file       SURGICAL HISTORY  patient denies any surgical history    CURRENT MEDICATIONS  Home Medications     Reviewed by Lori Majano R.N. (Registered Nurse) on 06/20/17 at 1225  Med List Status: Not Addressed    Medication Last Dose Status    albuterol 108 (90 BASE) MCG/ACT Aero Soln inhalation aerosol 1 WEEK Active    apixaban (ELIQUIS) 5mg Tab 6/18/2017 Active    ASTRAGALUS PO 6/17/2017 Active    Bioflavonoid Products (MARGARITO C PO) 6/17/2017 Active    Calcium Citrate-Vitamin D (CITRACAL MAXIMUM) 315-250 MG-UNIT Tab 6/17/2017 Active    esomeprazole (NEXIUM) 40 MG delayed-release capsule 6/17/2017 Active    Flavocoxid (LIMBREL) 500 MG Cap 6/17/2017 Active    fluticasone (VERAMYST) 27.5 MCG/SPRAY nasal spray 1 WEEK Active    Lecithin 1200 MG Cap 6/17/2017 Active    levothyroxine (SYNTHROID) 50 MCG Tab 6/18/2017 Active    metronidazole (METROGEL) 1 % gel 6/18/2017 Active    montelukast (SINGULAIR) 10 MG Tab 6/17/2017 Active    multivitamin (THERAGRAN) Tab 6/17/2017 Active    mupirocin  "(BACTROBAN) 2 % Ointment 6/18/2017 Active    mycophenolate (CELLCEPT) 500 MG tablet 6/18/2017 Active    predniSONE (DELTASONE) 10 MG Tab 6/17/2017 Active    Probiotic Product (PROBIOTIC DAILY PO) 6/18/2017 Active    sulfamethoxazole-trimethoprim (BACTRIM DS) 800-160 MG tablet  Active    sulfamethoxazole-trimethoprim (BACTRIM DS) 800-160 MG tablet  Active    tamsulosin (FLOMAX) 0.4 MG capsule 6/18/2017 Active    valacyclovir (VALTREX) 500 MG Tab 6/18/2017 Active    vitamin D (CHOLECALCIFEROL) 1000 UNIT Tab 6/17/2017 Active                ALLERGIES  Allergies   Allergen Reactions   • Ceftin [Cefuroxime Axetil]    • Ceftriaxone    • Pcn [Penicillins]        REVIEW OF SYSTEMS  See HPI for further details. Review of systems as above, otherwise all other systems are negative.     PHYSICAL EXAM  VITAL SIGNS: /72 mmHg  Pulse 80  Temp(Src) 37.4 °C (99.3 °F)  Resp 17  Ht 1.715 m (5' 7.5\")  Wt 71.1 kg (156 lb 12 oz)  BMI 24.17 kg/m2  SpO2 96%    Constitutional: No distress. Well nourished.  HENT: Head is atraumatic. Oropharynx is moist.   Eyes: Conjunctivae are normal. EOMI.   Respiratory: No respiratory distress. Equal chest expansion.   Abd:  Soft, non tender.  No guard.  Back;  No cvat.  Non tender midline.   Neurological: Alert. No focal deficits noted.    Skin: No rash. No Pallor.   Psych: Appropriate for clinical situation. Normal affect.      PROCEDURES     MEDICAL RECORD  I have reviewed patient's medical record and pertinent results are listed above.    COURSE & MEDICAL DECISION MAKING  I have reviewed any medical record information, laboratory studies and radiographic results as noted above.    Differential diagnoses include but not limited to: uti, pyelo     1:36 PM  Pharmacy is going to look into appropriate antibiotics for the patient.    1:58 PM  Patient is nontoxic-appearing, and afebrile. At this time, we are currently working on finding a another antibiotic to treat his UTI. He was seen and " evaluated 2 days ago, and has been taking the Bactrim, that appears to be resistant on culture. Because he appears well clinically, has no back pain and no fever, I feel is appropriate at this time to switch his antibiotic and have him get a repeat visit in 1-2 days.      2:01 PM  Katalina from pharmacy states to use bactrim for the uti.  He will stop the bactrim.     2:18 PM  Pt comfortable, afebrile, all questions answered.  He will be given his abx here, and they have a script to go.  I verified the [facility] pharmacy for them, and they are open.    This patient presents with a uti .  At this time, I have counseled the patient/family regarding their medications, pain control, and follow up.  They will continue their medications, if any, as prescribed.  They will return immediately for any worsening symptoms and/or any other medical concerns.  They will see their doctor, or contact the doctor provided, in 1-2 days for follow up.       FINAL IMPRESSION  UTI      Electronically signed by: Smith Bueno, 6/20/2017 12:41 PM

## 2017-06-20 NOTE — ED PROVIDER NOTES
"ED Provider Note    CHIEF COMPLAINT  Chief Complaint   Patient presents with   • Urinary Pain       HPI  Mike Lynn is a 85 y.o. male who presents to the emergency today with complaint of increased frequency of urination/burning with urination. Patient states that this started last evening. He's had a difficult time initiating urine stream is \"decreased\". He brought in by his wife is at bedside and feels he may have a bladder infection. He has some pressures type feeling over his lower abdomen which she describes his pain slight discomfort. No nausea no vomiting no chest pain shows breath fever chills. Has a history of myasthenia gravis is currently on medications for this. Symptoms occurred in the context of his normal activities.    REVIEW OF SYSTEMS  See HPI for further details. All other systems are negative.     PAST MEDICAL HISTORY  Past Medical History   Diagnosis Date   • Myasthenia gravis (CMS-[facility])        FAMILY HISTORY  [unfilled]    SOCIAL HISTORY  Social History     Social History   • Marital Status:      Spouse Name: N/A   • Number of Children: N/A   • Years of Education: N/A     Social History Main Topics   • Smoking status: Never Smoker    • Smokeless tobacco: None   • Alcohol Use: No   • Drug Use: No   • Sexual Activity: Not Asked     Other Topics Concern   • None     Social History Narrative       SURGICAL HISTORY  History reviewed. No pertinent past surgical history.    CURRENT MEDICATIONS  Home Medications     Reviewed by Diana Bhakta (Pharmacy Tech) on 06/18/17 at 1614  Med List Status: Complete    Medication Last Dose Status    albuterol 108 (90 BASE) MCG/ACT Aero Soln inhalation aerosol 1 WEEK Active    apixaban (ELIQUIS) 5mg Tab 6/18/2017 Active    ASTRAGALUS PO 6/17/2017 Active    Bioflavonoid Products (MARGARITO C PO) 6/17/2017 Active    Calcium Citrate-Vitamin D (CITRACAL MAXIMUM) 315-250 MG-UNIT Tab 6/17/2017 Active    esomeprazole (NEXIUM) 40 MG delayed-release " "capsule 6/17/2017 Active    Flavocoxid (LIMBREL) 500 MG Cap 6/17/2017 Active    fluticasone (VERAMYST) 27.5 MCG/SPRAY nasal spray 1 WEEK Active    Lecithin 1200 MG Cap 6/17/2017 Active    levothyroxine (SYNTHROID) 50 MCG Tab 6/18/2017 Active    metronidazole (METROGEL) 1 % gel 6/18/2017 Active    montelukast (SINGULAIR) 10 MG Tab 6/17/2017 Active    multivitamin (THERAGRAN) Tab 6/17/2017 Active    mupirocin (BACTROBAN) 2 % Ointment 6/18/2017 Active    mycophenolate (CELLCEPT) 500 MG tablet 6/18/2017 Active    predniSONE (DELTASONE) 10 MG Tab 6/17/2017 Active    Probiotic Product (PROBIOTIC DAILY PO) 6/18/2017 Active    tamsulosin (FLOMAX) 0.4 MG capsule 6/18/2017 Active    valacyclovir (VALTREX) 500 MG Tab 6/18/2017 Active    vitamin D (CHOLECALCIFEROL) 1000 UNIT Tab 6/17/2017 Active                ALLERGIES  Allergies   Allergen Reactions   • Ceftin [Cefuroxime Axetil]    • Ceftriaxone    • Pcn [Penicillins]        PHYSICAL EXAM  VITAL SIGNS: /57 mmHg  Pulse 85  Temp(Src) 36.7 °C (98.1 °F) (Temporal)  Resp 16  Ht 1.702 m (5' 7\")  Wt 72 kg (158 lb 11.7 oz)  BMI 24.85 kg/m2  SpO2 97% Room air O2: 95    Constitutional: Well developed, Well nourished, No acute distress, Non-toxic appearance.   HENT: Normocephalic, Atraumatic, Bilateral external ears normal, Oropharynx moist, No oral exudates, Nose normal.   Eyes: PERRLA, EOMI, Conjunctiva normal, No discharge.   Neck: Normal range of motion, No tenderness, Supple, No stridor.   Lymphatic: No lymphadenopathy noted.   Cardiovascular: Normal heart rate, Normal rhythm, No murmurs, No rubs, No gallops.   Thorax & Lungs: Normal breath sounds, No respiratory distress, No wheezing, No chest tenderness.   Abdomen: Bowel sounds normal, Soft, No tenderness, No masses, No pulsatile masses.   Skin: Warm, Dry, No erythema, No rash.   Back: No tenderness, No CVA tenderness.    Extremities: Intact distal pulses, No edema, No tenderness, No cyanosis, No clubbing. "   Musculoskeletal: Good range of motion in all major joints. No tenderness to palpation or major deformities noted.   Neurologic: Alert & oriented x 3, Normal motor function, Normal sensory function, No focal deficits noted.   Psychiatric: Affect normal, Judgment normal, Mood normal.         RADIOLOGY/PROCEDURES  Bladder scan shows residual 60 mL    COURSE & MEDICAL DECISION MAKING  Pertinent Labs & Imaging studies reviewed. (See chart for details)  Urine was positive for white blood cells/leukocytes with 20 white blood cells per high-power field. This is consistent with his history of frequency and dysuria. He has multiple allergies to medications. And also some contraindications because his myasthenia gravis was placed on Bactrim which he apparently has taken before without difficulty. Given his 1st dose here in the emergency room. He does not have urinary retention bladder scan shows proximal mid 60 mL after voiding. His white blood cell count is normal and he is afebrile and does not appear septic and is in no distress. Discussed with patient and his wife need for follow-up, he was placed on Bactrim for home. If he develops at any time fever below 100°, increased pain, vomiting, worsening symptoms or next 1-4 hours to return to emergency room. Otherwise follow-up with his primary care physician. He verbalizes understanding instructions as well as his wife at bedside discharge as above.    FINAL IMPRESSION  1. Acute cystitis/UTI  2. History of myasthenia gravis  3.         Electronically signed by: Rommel Marshall, 6/20/2017 12:47 PM

## 2017-06-20 NOTE — ED AVS SNAPSHOT
Bgifty Access Code: [email or web address]  Expires: 7/18/2017  5:04 PM    Your email address is not on file at [facility].  Email Addresses are required for you to sign up for Bgifty, please contact [phone] to verify your personal information and to provide your email address prior to attempting to register for Bgifty.    [place] [address]    [employer]  A secure, online tool to manage your health information     [facility]’s Bgifty® is a secure, online tool that connects you to your personalized health information from the privacy of your home -- day or night - making it very easy for you to manage your healthcare. Once the activation process is completed, you can even access your medical information using the Bgifty merritt, which is available for free in the Apple Merritt store or Google Play store.     To learn more about Bgifty, visit [email or web address] ([email or web address]    There are two levels of access available (as shown below):   My Chart Features  [facility] Primary Care Doctor [facility]  Specialists [facility]  Urgent  Care Non-[facility] Primary Care Doctor   Email your healthcare team securely and privately 24/7 X X X    Manage appointments: schedule your next appointment; view details of past/upcoming appointments X      Request prescription refills. X      View recent personal medical records, including lab and immunizations X X X X   View health record, including health history, allergies, medications X X X X   Read reports about your outpatient visits, procedures, consult and ER notes X X X X   See your discharge summary, which is a recap of your hospital and/or ER visit that includes your diagnosis, lab results, and care plan X X  X     How to register for [employer]:  Once your e-mail address has been verified, follow the following steps to sign up for Bgifty.     1. Go to  [email or web address]  2. Click on the Sign Up Now box, which takes you to the New Member Sign Up page. You  will need to provide the following information:  a. Enter your KeyLemon Access Code exactly as it appears at the top of this page. (You will not need to use this code after you’ve completed the sign-up process. If you do not sign up before the expiration date, you must request a new code.)   b. Enter your date of birth.   c. Enter your home email address.   d. Click Submit, and follow the next screen’s instructions.  3. Create a [email or web address] ID. This will be your KeyLemon login ID and cannot be changed, so think of one that is secure and easy to remember.  4. Create a KeyLemon password. You can change your password at any time.  5. Enter your Password Reset Question and Answer. This can be used at a later time if you forget your password.   6. Enter your e-mail address. This allows you to receive e-mail notifications when new information is available in KeyLemon.  7. Click Sign Up. You can now view your health information.    For assistance activating your KeyLemon account, call [phone]

## 2017-06-22 LAB
BACTERIA UR CULT: ABNORMAL
BACTERIA UR CULT: ABNORMAL
SIGNIFICANT IND 70042: ABNORMAL
SITE SITE: ABNORMAL
SOURCE SOURCE: ABNORMAL

## 2017-06-22 NOTE — ED NOTES
ED Positive Culture Follow-up/Notification Note:    Date: 6/22/17     Patient seen in the ED on 6/20/2017 for dysuria despite being treated with Bactrim.   1. Acute UTI       Discharge Medication List as of 6/20/2017  2:22 PM      START taking these medications    Details   nitrofurantoin monohydr macro (MACROBID) 100 MG Cap Take 1 Cap by mouth 2 times a day., Disp-20 Cap, R-0, Print Rx Paper             Allergies: Ceftin; Ceftriaxone; and Pcn     Final cultures:   Results     Procedure Component Value Units Date/Time    URINE CULTURE(NEW) [[id number]]  (Abnormal)  (Susceptibility) Collected:  06/20/17 1230    Order Status:  Completed Specimen Information:  Urine Updated:  06/22/17 1123     Significant Indicator POS (POS)      Source UR      Site --      Urine Culture -- (A)      Urine Culture -- (A)      Result:        Klebsiella oxytoca  >100,000 cfu/mL      Culture & Susceptibility     KLEBSIELLA OXYTOCA     Antibiotic Sensitivity Microscan Unit Status    Ampicillin Resistant >16 mcg/mL Final    Cefepime Sensitive <=8 mcg/mL Final    Cefotaxime Sensitive <=2 mcg/mL Final    Cefotetan Sensitive <=16 mcg/mL Final    Ceftazidime Sensitive <=1 mcg/mL Final    Ceftriaxone Sensitive <=8 mcg/mL Final    Cefuroxime Sensitive <=4 mcg/mL Final    Cephalothin Sensitive <=8 mcg/mL Final    Ciprofloxacin Intermediate 2 mcg/mL Final    Gentamicin Resistant >8 mcg/mL Final    Levofloxacin Sensitive <=2 mcg/mL Final    Nitrofurantoin Sensitive <=32 mcg/mL Final    Pip/Tazobactam Sensitive <=16 mcg/mL Final    Piperacillin Resistant >64 mcg/mL Final    Tigecycline Sensitive <=2 mcg/mL Final    Tobramycin Sensitive <=4 mcg/mL Final    Trimeth/Sulfa Resistant >2/38 mcg/mL Final                       URINALYSIS,CULTURE IF INDICATED [[id number]]  (Abnormal) Collected:  06/20/17 1230    Order Status:  Completed Specimen Information:  Urine Updated:  06/20/17 1332     Micro Urine Req Microscopic      Color Yellow      Character Sl Cloudy  (A)      Specific Gravity 1.015      Ph 5.5      Glucose Negative mg/dL      Ketones Negative mg/dL      Protein Negative mg/dL      Bilirubin Negative      Nitrite Negative      Leukocyte Esterase Large (A)      Occult Blood Negative      Culture Indicated Yes UA Culture           Plan:   Patient with myasthenia gravis and refuses alternative treatments as they may worsen his symptoms.   Patient without any flank pain or systemic symptoms.   Nitrofurantoin will provide adequate coverage against his UTI. No change required.     Katalina Ricardo

## 2017-10-04 NOTE — ED AVS SNAPSHOT
6/18/2017    Mike Lynn  [address] [place] [address]    Dear Mike:    [facility] wants to ensure your discharge home is safe and you or your loved ones have had all of your questions answered regarding your care after you leave the hospital.    Below is a list of resources and contact information should you have any questions regarding your hospital stay, follow-up instructions, or active medical symptoms.    Questions or Concerns Regarding… Contact   Medical Questions Related to Your Discharge  (7 days a week, 8am-5pm) Contact a Nurse Care Coordinator   [phone]   Medical Questions Not Related to Your Discharge  (24 hours a day / 7 days a week)  Contact the Nurse Health Line   [phone]    Medications or Discharge Instructions Refer to your discharge packet   or contact your [facility] Primary Care Provider   [phone]   Follow-up Appointment(s) Schedule your appointment via Ideapod   or contact Scheduling [phone]   Billing Review your statement via Ideapod  or contact Billing [phone]   Medical Records Review your records via Ideapod   or contact Medical Records [phone]     You may receive a telephone call within two days of discharge. This call is to make certain you understand your discharge instructions and have the opportunity to have any questions answered. You can also easily access your medical information, test results and upcoming appointments via the Ideapod free online health management tool. You can learn more and sign up at Genius/Ideapod. For assistance setting up your Ideapod account, please call [phone].    Once again, we want to ensure your discharge home is safe and that you have a clear understanding of any next steps in your care. If you have any questions or concerns, please do not hesitate to contact us, we are here for you. Thank you for choosing [facility] for your healthcare needs.    Sincerely,    Your [facility] Healthcare Team          DISPLAY PLAN FREE TEXT